# Patient Record
Sex: FEMALE | Race: OTHER | HISPANIC OR LATINO | Employment: FULL TIME | URBAN - METROPOLITAN AREA
[De-identification: names, ages, dates, MRNs, and addresses within clinical notes are randomized per-mention and may not be internally consistent; named-entity substitution may affect disease eponyms.]

---

## 2024-11-08 ENCOUNTER — OCCUPATIONAL HEALTH (OUTPATIENT)
Dept: URGENT CARE | Facility: CLINIC | Age: 49
End: 2024-11-08

## 2024-11-08 VITALS
HEART RATE: 66 BPM | WEIGHT: 172 LBS | TEMPERATURE: 98 F | OXYGEN SATURATION: 98 % | RESPIRATION RATE: 17 BRPM | SYSTOLIC BLOOD PRESSURE: 98 MMHG | BODY MASS INDEX: 28.66 KG/M2 | HEIGHT: 65 IN | DIASTOLIC BLOOD PRESSURE: 42 MMHG

## 2024-11-08 DIAGNOSIS — Z02.89 ENCOUNTER FOR PHYSICAL EXAMINATION RELATED TO EMPLOYMENT: Primary | ICD-10-CM

## 2024-11-15 ENCOUNTER — PATIENT MESSAGE (OUTPATIENT)
Dept: GASTROENTEROLOGY | Facility: CLINIC | Age: 49
End: 2024-11-15
Payer: COMMERCIAL

## 2024-11-15 ENCOUNTER — PATIENT MESSAGE (OUTPATIENT)
Dept: INTERNAL MEDICINE | Facility: CLINIC | Age: 49
End: 2024-11-15
Payer: COMMERCIAL

## 2024-12-03 ENCOUNTER — OFFICE VISIT (OUTPATIENT)
Dept: GASTROENTEROLOGY | Facility: CLINIC | Age: 49
End: 2024-12-03
Payer: COMMERCIAL

## 2024-12-03 VITALS — HEIGHT: 66 IN | BODY MASS INDEX: 27.88 KG/M2 | TEMPERATURE: 99 F | WEIGHT: 173.5 LBS

## 2024-12-03 DIAGNOSIS — R14.0 BLOATING SYMPTOM: ICD-10-CM

## 2024-12-03 DIAGNOSIS — K58.2 IRRITABLE BOWEL SYNDROME WITH BOTH CONSTIPATION AND DIARRHEA: ICD-10-CM

## 2024-12-03 DIAGNOSIS — Z87.19 HISTORY OF GALLSTONES: ICD-10-CM

## 2024-12-03 DIAGNOSIS — R10.10 UPPER ABDOMINAL PAIN: Primary | ICD-10-CM

## 2024-12-03 DIAGNOSIS — Z87.898 HISTORY OF FEVER: ICD-10-CM

## 2024-12-03 DIAGNOSIS — R14.2 ERUCTATION: ICD-10-CM

## 2024-12-03 DIAGNOSIS — Z87.19 HISTORY OF GASTROESOPHAGEAL REFLUX (GERD): ICD-10-CM

## 2024-12-03 DIAGNOSIS — K44.9 HIATAL HERNIA: ICD-10-CM

## 2024-12-03 DIAGNOSIS — Z87.898 HISTORY OF HEADACHE: ICD-10-CM

## 2024-12-03 PROCEDURE — 99999 PR PBB SHADOW E&M-EST. PATIENT-LVL III: CPT | Mod: PBBFAC,,, | Performed by: NURSE PRACTITIONER

## 2024-12-03 PROCEDURE — 3008F BODY MASS INDEX DOCD: CPT | Mod: CPTII,S$GLB,, | Performed by: NURSE PRACTITIONER

## 2024-12-03 PROCEDURE — 3044F HG A1C LEVEL LT 7.0%: CPT | Mod: CPTII,S$GLB,, | Performed by: NURSE PRACTITIONER

## 2024-12-03 PROCEDURE — 1159F MED LIST DOCD IN RCRD: CPT | Mod: CPTII,S$GLB,, | Performed by: NURSE PRACTITIONER

## 2024-12-03 PROCEDURE — 1160F RVW MEDS BY RX/DR IN RCRD: CPT | Mod: CPTII,S$GLB,, | Performed by: NURSE PRACTITIONER

## 2024-12-03 PROCEDURE — 99215 OFFICE O/P EST HI 40 MIN: CPT | Mod: S$GLB,,, | Performed by: NURSE PRACTITIONER

## 2024-12-03 RX ORDER — ESOMEPRAZOLE MAGNESIUM 40 MG/1
CAPSULE, DELAYED RELEASE ORAL
COMMUNITY
Start: 2024-11-28

## 2024-12-03 NOTE — PROGRESS NOTES
Subjective:       Patient ID: Lester Gutierrez is a 49 y.o. female Body mass index is 28 kg/m².    Chief Complaint: Abdominal Pain (bloating)    Established patient of MARTY Scruggs NP, & myself.     Patient did not complete gastric emptying study as previously recommended. Had ultrasound done in 9/2024. CT scan ordered by [place] [facility] doctor which she has not completed yet. Patient is intereseted in motility and/or pH testing.    PRIOR HISTORY: Patient has seen multiple GI providers, including work up in [place] and [place]. Patient reports she had EGD and colonoscopy done in 7/2022 in [place] but patient did not bring copy of these records. I was able to see the date of the procedures in care everywhere but not able to pull up the report.  Patient did bring her laptop which had prior copies of radiology reports of an MRCP done 12/3/2021 at a hospital in [place], which showed evidence of mesenteric adenitis described by radiologist as small scattered mesenteric lymph nodes that are nonspecific. I requested patient send us a copy of prior abdominal imaging radiology reports via e-mail through [facility] or print and send us a copy of it.    GI Problem  The primary symptoms include fever (had fever yesterday tmax 100.1 F, none today), weight loss (trying to lose weight), abdominal pain and diarrhea (occasional, denies currently). Primary symptoms do not include fatigue, nausea, vomiting, melena, hematemesis, jaundice, hematochezia or dysuria.   The abdominal pain began more than 2 days ago (started several years ago, had EGD and colonoscopy done in 7/2022 in [place]). Progression: waxing and waning. The abdominal pain is located in the LUQ, epigastric region and RUQ (sometimes after eating, takes long time to digest food; occurs a few times a week). The severity of the abdominal pain is 0/10 (currently). The abdominal pain is relieved by nothing (worse with sweets).   The illness is also significant for bloating  (frequent belching) and constipation (occasional, denies currently). The illness does not include chills, dysphagia or odynophagia. Associated symptoms comments: Bowel movements are once daily of formed stool currently  PAST TREATMENT: reglan, protonix. Significant associated medical issues include GERD (intermittent reflux; takes pepcid 40 mg daily PRN & nexium 40 mg daily; frequent belching, reflux occurs about once a week), gallstones and irritable bowel syndrome. Associated medical issues do not include inflammatory bowel disease.     Review of Systems   Constitutional:  Positive for fever (had fever yesterday tmax 100.1 F, none today) and weight loss (trying to lose weight). Negative for appetite change, chills and fatigue.   HENT:  Negative for trouble swallowing.    Respiratory:  Negative for cough, choking and shortness of breath.    Cardiovascular:  Negative for chest pain.   Gastrointestinal:  Positive for abdominal pain, bloating (frequent belching), constipation (occasional, denies currently) and diarrhea (occasional, denies currently). Negative for anal bleeding, blood in stool, dysphagia, hematemesis, hematochezia, jaundice, melena, nausea, rectal pain and vomiting.   Genitourinary:  Negative for difficulty urinating, dysuria and flank pain.   Neurological:  Positive for headaches (reports at times she gets a headache with eating). Negative for weakness.       Patient's last menstrual period was 01/01/2021.  Past Medical History:   Diagnosis Date    Colon polyp     Diverticulosis     Gallstone     GERD (gastroesophageal reflux disease)     Hiatal hernia     Hyperlipidemia     Small intestinal bacterial overgrowth (SIBO)      Past Surgical History:   Procedure Laterality Date    COLONOSCOPY  01/06/2020    Dr. South; small diverticulum in the ascending colon, otherwise normal; repeat in 10 years    COLONOSCOPY  07/26/2022    done in [place]    EGD - EXTERNAL RESULT  07/26/2022    done in [place]    UPPER  GASTROINTESTINAL ENDOSCOPY  06/24/2021    Dr. Gayle; no report available; Bx: chronic gastritis, negative for metaplasia, dysplasia and for organisms    UPPER GASTROINTESTINAL ENDOSCOPY  12/03/2020    Dr. Gayle; small hiatal hernia; erythematous mucosa in the antrum; Bx: chronic gastritis, focal intestinal metaplasia, no H Pylori     Family History   Problem Relation Name Age of Onset    Hyperlipidemia Mother      Hypertension Mother      Hyperlipidemia Father      Alzheimer's disease Father      Stroke Maternal Grandfather      Stroke Paternal Grandmother      Heart disease Paternal Grandfather      Colon cancer Maternal Uncle      Crohn's disease Other great maternal uncle     Ulcerative colitis Neg Hx       Social History     Tobacco Use    Smoking status: Never     Passive exposure: Never    Smokeless tobacco: Never   Substance Use Topics    Alcohol use: Not Currently    Drug use: Never     Wt Readings from Last 10 Encounters:   12/03/24 78.7 kg (173 lb 8 oz)   11/08/24 78 kg (172 lb)   09/27/24 77.7 kg (171 lb 4.8 oz)   09/26/24 77.6 kg (171 lb 1.2 oz)   09/25/24 78 kg (171 lb 15.3 oz)   06/25/24 75.5 kg (166 lb 7.2 oz)   01/17/24 76.1 kg (167 lb 14.1 oz)   11/22/23 73.1 kg (161 lb 2.5 oz)   09/22/23 72.3 kg (159 lb 6.3 oz)   05/05/23 72.3 kg (159 lb 8 oz)     Lab Results   Component Value Date    WBC 4.14 06/25/2024    HGB 12.9 06/25/2024    HCT 40.5 06/25/2024    MCV 92 06/25/2024     06/25/2024     CMP  Sodium   Date Value Ref Range Status   08/09/2024 137 136 - 145 mmol/L Final     Potassium   Date Value Ref Range Status   08/09/2024 3.9 3.5 - 5.1 mmol/L Final     Chloride   Date Value Ref Range Status   08/09/2024 103 95 - 110 mmol/L Final     CO2   Date Value Ref Range Status   08/09/2024 28 23 - 29 mmol/L Final     Glucose   Date Value Ref Range Status   08/09/2024 85 70 - 110 mg/dL Final     BUN   Date Value Ref Range Status   08/09/2024 17 6 - 20 mg/dL Final     Creatinine   Date Value Ref  "Range Status   08/09/2024 0.7 0.5 - 1.4 mg/dL Final     Calcium   Date Value Ref Range Status   08/09/2024 9.5 8.7 - 10.5 mg/dL Final     Total Protein   Date Value Ref Range Status   08/09/2024 7.2 6.0 - 8.4 g/dL Final     Albumin   Date Value Ref Range Status   08/09/2024 4.0 3.5 - 5.2 g/dL Final     Total Bilirubin   Date Value Ref Range Status   08/09/2024 0.5 0.1 - 1.0 mg/dL Final     Comment:     For infants and newborns, interpretation of results should be based  on gestational age, weight and in agreement with clinical  observations.    Premature Infant recommended reference ranges:  Up to 24 hours.............<8.0 mg/dL  Up to 48 hours............<12.0 mg/dL  3-5 days..................<15.0 mg/dL  6-29 days.................<15.0 mg/dL       Alkaline Phosphatase   Date Value Ref Range Status   08/09/2024 88 55 - 135 U/L Final     AST   Date Value Ref Range Status   08/09/2024 26 10 - 40 U/L Final     ALT   Date Value Ref Range Status   08/09/2024 52 (H) 10 - 44 U/L Final     Anion Gap   Date Value Ref Range Status   08/09/2024 6 (L) 8 - 16 mmol/L Final     eGFR if    Date Value Ref Range Status   11/29/2021 >60.0 >60 mL/min/1.73 m^2 Final     eGFR if non    Date Value Ref Range Status   11/29/2021 >60.0 >60 mL/min/1.73 m^2 Final     Comment:     Calculation used to obtain the estimated glomerular filtration  rate (eGFR) is the CKD-EPI equation.        Lab Results   Component Value Date    LIPASE 31 10/27/2021     Lab Results   Component Value Date    TSH 1.516 06/25/2024 2022 sucrose testing WNL    Reviewed prior medical records including radiology report of 11/9/2024 CT chest; 5/9/2023 CT abdomen pelvis with contrast; 7/18/2022 pelvic ultrasound; 9/14/2024 abdominal ultrasound in media; 3/7/2023 visit note with Dr. Way "Pt returns re: chronic GI symptoms. Chart reviewed in detail. Outside EGD/C-scope reports and path results from [place], [place], 2022 reviewed. " "Endoscopic exams unremarkable with benign biopsies. Pt continues with "burning" abd pain, upper abdomen, LUQ, left flank, back. No fever or jaundice. Appetite and weight stable/increased. No GI bleeding. No vomiting. No dysphagia. Positive pyrosis. Taking pepcid 20 mg daily (stopped nexium). Pt acknowledges significant stress at work. Repeat CT recommended by NP Johnna to f/u adenitis on CT in 9/2022. Recent LFT's normal." & "IMP: Multitude of non-specific GI symptoms, likely multi-factorial etiology, including GERD, IBS/spasm, prior mesenteric adenitis, exacerbated by life stressors.  PLAN: 1. Increase pepcid to 40 mg (pt agreeable).              2. Trial of PRN librax (I discussed rationale for medication, potential side effects, which pt understands and agreeable. She will start with PM dose when staying at home to see how tolerated).              3. F/U abd CT scan"; 10/17/2022 visit note with orthopedics; & endoscopy history (see surgical history/procedures).    Reviewed prior medical records in care everywhere including 10/7/2024 GI telemedicine visit note "Lester Gutierrez is a 48 y.o. female patient who I am seeing in consultation today to discuss her medical progress.  Patient continues to have eructation, gas, presently using esomeprazole  Abdominal ultrasound with the presence of a possible small gallstone. The patient continues to have pain in the upper abdomen.  Mild elevation of the liver enzymes.  History of SIBO. The patient has been on rifaximin intermittently.  Unclear the etiology of her symptoms. Possibilities includes biliary colic, bacterial overgrowth, irritable bowel syndrome, or gastroesophageal reflux disease." & "Plan: Abdominal and pelvic CT scan  We discussed with the patient the possibility of requesting surgical consultation for a second opinion  The patient understands that if the symptom continues or worsens, then depending on the circumstances, further evaluation or " "therapeutic efforts may be necessary at that point. Discussed with patient."; 8/12/2022 GI telemedicine visit note; & endoscopy history (see surgical history).      Objective:      Physical Exam  Vitals and nursing note reviewed.   Constitutional:       General: She is not in acute distress.     Appearance: Normal appearance. She is well-developed. She is not diaphoretic.   HENT:      Mouth/Throat:      Lips: Pink. No lesions.      Mouth: Mucous membranes are moist. No oral lesions.      Tongue: No lesions.      Pharynx: Oropharynx is clear. No pharyngeal swelling or posterior oropharyngeal erythema.   Eyes:      General: No scleral icterus.     Conjunctiva/sclera: Conjunctivae normal.   Pulmonary:      Effort: Pulmonary effort is normal. No respiratory distress.      Breath sounds: Normal breath sounds. No wheezing.   Abdominal:      General: Bowel sounds are normal. There is no distension or abdominal bruit.      Palpations: Abdomen is soft. Abdomen is not rigid. There is no mass.      Tenderness: There is no abdominal tenderness. There is no guarding or rebound.   Skin:     General: Skin is warm and dry.      Coloration: Skin is not jaundiced or pale.      Findings: No erythema or rash.   Neurological:      Mental Status: She is alert and oriented to person, place, and time.   Psychiatric:         Behavior: Behavior normal.         Thought Content: Thought content normal.         Judgment: Judgment normal.         Assessment:       1. Upper abdominal pain    2. Bloating symptom    3. Irritable bowel syndrome with both constipation and diarrhea    4. History of gallstones    5. Hiatal hernia    6. History of gastroesophageal reflux (GERD)    7. Eructation    8. History of fever    9. History of headache        Plan:       Upper abdominal pain & Bloating symptom  -     NM Gastric Emptying; Future; Expected date: 12/03/2024  -     Ambulatory referral/consult to Gastroenterology; Future; Expected date: 12/10/2024; " tertiary care referral to see one of the GI specialist at [facility]. Our [facility] facility does not perform pH studies  - patient reports she will send us a copy of the radiology report of the recent abdominal ultrasound she had done  - can complete CT scan as ordered by other GI provider. Recommend staying with one GI group for continuity of care. Patient verbalized understanding  - avoid/minimize use of NSAIDs- since they can cause GI upset, bleeding and/or ulcers. If NSAID must be taken, recommend take with food.  - recommend OTC simethicone as directed, such as Phazyme or Gas-x  - recommend low gas diet: Reduce or eliminate these foods from your diet: Broccoli, Cauliflower, [place] sprouts, Cabbage, Cooked dried beans, Carbonated beverages (sparkling water, soda, beer, champagne)  Other Causes Of Excess Gas Include:   1) EATING TOO FAST or TALKING WHILE YOU CHEW may cause you to swallow air. This increases the amount of gas in the stomach and may worsen your symptoms.  --> Chew each mouthful completely before swallowing. Take your time.  2) OVEREATING may increase the feeling of being bloated and cause more gas.  --> When you are full, stop eating.  3) CONSTIPATION can increase the amount of normal intestinal gas.  --> Avoid constipation by increasing the amount of fiber in your diet by including whole cereal grains, fresh vegetables (except those in the above list) and fresh fruits. High-fiber foods absorb water and carry it out of the body. When increasing the amount of fiber in your diet, you also need to increase the amount of water that you drink. You should drink at least eight 8-ounce glasses of water (two quarts) per day.    Irritable bowel syndrome with both constipation and diarrhea  - recommend OTC probiotic, such as Align or Culturelle, as directed  - avoid lactose  - drink adequate water intake  - smaller, more frequent meals  - avoid trigger foods  - Recommend high fiber diet (20-30  grams of fiber daily)/OTC fiber supplements daily as directed, such as Benefiber or Metamucil.    History of gallstones  - Recommend follow-up with general surgery for continued evaluation and management. Referral placed.    Hiatal hernia  - if symptoms persist despite medication management and lifestyle/dietary modifications, we can refer to general surgery to consult about surgical options    History of gastroesophageal reflux (GERD) & Eructation  -     Ambulatory referral/consult to Gastroenterology; Future; Expected date: 12/10/2024; tertiary care referral to see one of the GI specialist at [facility]. Our [facility] facility does not perform pH studies  - CONTINUE NEXIUM 40 MG DAILY AS DIRECTED  - TAKE PEPCID 40 MG PO ONCE DAILY, RATHER THAN PRN  - avoid/minimize use of NSAIDs- since they can cause GI upset, bleeding and/or ulcers. If NSAID must be taken, recommend take with food.    History of fever  Recommend follow-up with Primary Care Provider for continued evaluation and management.    History of headache  Recommend follow-up with Primary Care Provider/neurology for continued evaluation and management.    Follow up in about 1 month (around 1/3/2025), or if symptoms worsen or fail to improve.      If no improvement in symptoms or symptoms worsen, call/follow-up at clinic or go to ER.        40 minutes of total time spent on the encounter, which includes face to face time and non-face to face time preparing to see the patient (e.g., review of tests), Obtaining and/or reviewing separately obtained history, Documenting clinical information in the electronic or other health record, Independently interpreting results (not separately reported) and communicating results to the patient/family/caregiver, or Care coordination (not separately reported).

## 2024-12-05 ENCOUNTER — NURSE TRIAGE (OUTPATIENT)
Dept: ADMINISTRATIVE | Facility: CLINIC | Age: 49
End: 2024-12-05
Payer: COMMERCIAL

## 2024-12-05 NOTE — TELEPHONE ENCOUNTER
Fell this morning; denies hitting head, denies LOC    Below ankle on foot, swelling  R foot, R side of R foot  Reports that swollen part of foot appears gray and green in color    Reports that when she twisted the ankle, a green lump appeared under skin.      Able to move toes  Denies bleeding    Has been icing the foot, not helping with the swelling.    Advised pt to be seen in ER now. Provided pt with ER wait times. Pt VU.       Reason for Disposition   Sounds like a serious injury to the triager    Additional Information   Negative: Serious injury with multiple fractures (broken bones)   Negative: [1] Major bleeding (e.g., actively dripping or spurting) AND [2] can't be stopped   Negative: Amputation   Negative: Looks like a dislocated joint (very crooked or deformed)   Negative: Sounds like a life-threatening emergency to the triager   Negative: Bullet wound, stabbed by knife, or other serious penetrating wound   Negative: Skin is split open or gaping (or length > 1/2 inch or 12 mm)   Negative: [1] Bleeding AND [2] won't stop after 10 minutes of direct pressure (using correct technique)   Negative: [1] Dirt in the wound AND [2] not removed with 15 minutes of scrubbing   Negative: Can't stand (bear weight) or walk   Negative: [1] New numbness (loss of sensation) of foot or toe(s) AND [2] present now    Protocols used: Foot Injury-A-

## 2024-12-11 ENCOUNTER — TELEPHONE (OUTPATIENT)
Dept: GASTROENTEROLOGY | Facility: CLINIC | Age: 49
End: 2024-12-11
Payer: COMMERCIAL

## 2024-12-11 NOTE — TELEPHONE ENCOUNTER
Left a voicemail for patient regarding scheduling an appointment in Dr. Hernández's clinic. Provided [facility] GI clinic call back number.

## 2024-12-13 ENCOUNTER — HOSPITAL ENCOUNTER (OUTPATIENT)
Dept: RADIOLOGY | Facility: HOSPITAL | Age: 49
Discharge: HOME | End: 2024-12-13
Attending: NURSE PRACTITIONER
Payer: COMMERCIAL

## 2024-12-13 ENCOUNTER — TELEPHONE (OUTPATIENT)
Dept: NEUROLOGY | Facility: CLINIC | Age: 49
End: 2024-12-13
Payer: COMMERCIAL

## 2024-12-13 ENCOUNTER — PATIENT MESSAGE (OUTPATIENT)
Dept: GASTROENTEROLOGY | Facility: CLINIC | Age: 49
End: 2024-12-13
Payer: COMMERCIAL

## 2024-12-13 ENCOUNTER — TELEPHONE (OUTPATIENT)
Dept: INTERNAL MEDICINE | Facility: CLINIC | Age: 49
End: 2024-12-13
Payer: COMMERCIAL

## 2024-12-13 DIAGNOSIS — R14.0 BLOATING SYMPTOM: ICD-10-CM

## 2024-12-13 DIAGNOSIS — R10.10 UPPER ABDOMINAL PAIN: ICD-10-CM

## 2024-12-13 PROCEDURE — A9541 TC99M SULFUR COLLOID: HCPCS | Performed by: NURSE PRACTITIONER

## 2024-12-13 PROCEDURE — 78264 GASTRIC EMPTYING IMG STUDY: CPT | Mod: 26,,, | Performed by: NUCLEAR MEDICINE

## 2024-12-13 PROCEDURE — 78264 GASTRIC EMPTYING IMG STUDY: CPT | Mod: TC

## 2024-12-13 RX ORDER — TECHNETIUM TC 99M SULFUR COLLOID 2 MG
1 KIT MISCELLANEOUS
Status: COMPLETED | OUTPATIENT
Start: 2024-12-13 | End: 2024-12-13

## 2024-12-13 RX ADMIN — TECHNETIUM TC 99M SULFUR COLLOID KIT 1.1 MILLICURIE: KIT at 08:12

## 2024-12-13 NOTE — TELEPHONE ENCOUNTER
Patient arrived to [facility] to drop off MRI CD. Patient stated she cannot lose the disc and asked if she can stay and wait for CD to be uploaded. Informed patient that she will have to come back another day to pick CD back up as we would have to fill out a form and drop it off to radiology so they can upload images to patient's chart. Patient was upset and asked why she can't stay and wait for it and that if the disc gets lost [place]. Informed patient that we haven't gotten any complaints of lost CD's so far and that we have to go through a process to fill out a CD form, drop it off to radiology in a mailbox, and that they will call her when it is ready to be picked up.  Patient then stated that the provider is so complicated and that she was going to go downstairs to drop it off herself. Informed patient she will not have access to radiology to be able to go back there and drop it off. Patient snatched back CD and left.

## 2025-02-24 ENCOUNTER — PATIENT MESSAGE (OUTPATIENT)
Dept: GASTROENTEROLOGY | Facility: CLINIC | Age: 50
End: 2025-02-24
Payer: COMMERCIAL

## 2025-02-26 NOTE — TELEPHONE ENCOUNTER
The gastric emptying study showed negative for gastroparesis. We recommend seeing one of the GI providers at [facility] for further evaluation and management of chronic GI symptoms as discussed during our last visit. Referral placed previously.  Sincerely,  April LOPES

## 2025-03-11 ENCOUNTER — TELEPHONE (OUTPATIENT)
Dept: INTERNAL MEDICINE | Facility: CLINIC | Age: 50
End: 2025-03-11
Payer: COMMERCIAL

## 2025-03-12 DIAGNOSIS — R74.8 ELEVATED LIVER ENZYMES: Primary | ICD-10-CM

## 2025-03-12 DIAGNOSIS — Z00.00 ANNUAL PHYSICAL EXAM: ICD-10-CM

## 2025-03-14 ENCOUNTER — OFFICE VISIT (OUTPATIENT)
Dept: INTERNAL MEDICINE | Facility: CLINIC | Age: 50
End: 2025-03-14
Payer: COMMERCIAL

## 2025-03-14 VITALS — SYSTOLIC BLOOD PRESSURE: 116 MMHG | HEART RATE: 63 BPM | DIASTOLIC BLOOD PRESSURE: 56 MMHG

## 2025-03-14 DIAGNOSIS — R41.3 MEMORY CHANGES: ICD-10-CM

## 2025-03-14 DIAGNOSIS — E83.52 SERUM CALCIUM ELEVATED: ICD-10-CM

## 2025-03-14 DIAGNOSIS — K29.70 GASTRITIS WITH INTESTINAL METAPLASIA OF STOMACH: ICD-10-CM

## 2025-03-14 DIAGNOSIS — F41.1 GENERALIZED ANXIETY DISORDER: ICD-10-CM

## 2025-03-14 DIAGNOSIS — M85.80 OSTEOPENIA, UNSPECIFIED LOCATION: ICD-10-CM

## 2025-03-14 DIAGNOSIS — Z12.9 CANCER SCREENING: ICD-10-CM

## 2025-03-14 DIAGNOSIS — Z00.00 ANNUAL PHYSICAL EXAM: Primary | ICD-10-CM

## 2025-03-14 DIAGNOSIS — F51.12 INSUFFICIENT SLEEP SYNDROME: ICD-10-CM

## 2025-03-14 DIAGNOSIS — E78.2 MIXED HYPERLIPIDEMIA: ICD-10-CM

## 2025-03-14 DIAGNOSIS — Z82.0 FAMILY HISTORY OF ALZHEIMER'S DISEASE: ICD-10-CM

## 2025-03-14 DIAGNOSIS — K31.A0 GASTRITIS WITH INTESTINAL METAPLASIA OF STOMACH: ICD-10-CM

## 2025-03-14 DIAGNOSIS — Z00.00 HEALTHCARE MAINTENANCE: ICD-10-CM

## 2025-03-14 DIAGNOSIS — Z78.0 MENOPAUSE: ICD-10-CM

## 2025-03-14 DIAGNOSIS — K58.2 IRRITABLE BOWEL SYNDROME WITH BOTH CONSTIPATION AND DIARRHEA: ICD-10-CM

## 2025-03-14 PROBLEM — M62.81 MUSCLE WEAKNESS: Status: RESOLVED | Noted: 2022-11-21 | Resolved: 2025-03-14

## 2025-03-14 PROBLEM — Z71.89 ACP (ADVANCE CARE PLANNING): Status: RESOLVED | Noted: 2024-06-25 | Resolved: 2025-03-14

## 2025-03-14 PROBLEM — T78.1XXA GASTROINTESTINAL FOOD SENSITIVITY: Status: RESOLVED | Noted: 2021-04-21 | Resolved: 2025-03-14

## 2025-03-14 PROBLEM — R07.89 INTERMITTENT LEFT-SIDED CHEST PAIN: Status: RESOLVED | Noted: 2022-05-06 | Resolved: 2025-03-14

## 2025-03-14 PROCEDURE — 99999 PR PBB SHADOW E&M-EST. PATIENT-LVL IV: CPT | Mod: PBBFAC,,, | Performed by: STUDENT IN AN ORGANIZED HEALTH CARE EDUCATION/TRAINING PROGRAM

## 2025-03-14 NOTE — ASSESSMENT & PLAN NOTE
Not on medication.    Continue management as per gynecology.    FSH, LH, estradiol order as per patient's request

## 2025-03-14 NOTE — ASSESSMENT & PLAN NOTE
Patient continues experiencing epigastric pain and bloating.    I am ordering EGD and colonoscopy.    Continue PPI  Follow-up with gastroenterology

## 2025-03-14 NOTE — PROGRESS NOTES
"Subjective:       Patient ID: Lester Gutierrez is a 49 y.o. female.    Chief Complaint: Annual Exam    HPI    Lester Gutierrez is a 49 y.o. 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  She wants to get tested for all the preventative care screening exams and images as necessary.      She has several concerns today:    She wants a full checkup including labs and images.    She wants to have a repeat PTH in view of a history of elevated PTH in the past.  Currently asymptomatic.    She continues to experience mild back and hip pain.    She wants to have a bone density scan done given she was diagnosed with osteopenia in the past.  She wants to have a urinalysis done because she has noticed "White powder in the urine". No abdominal pain.  She checks her glucose levels at home, and she has noticed that her fasting glucose has been mildly elevated.  Insufficient sleep: She continues to take melatonin to help her sleep.  She is doing physical activity.  Anxiety:  Improved.  She discontinued the use of Lexapro but she still has some tablets left.  She has been doing physical activity and tried to manage her anxiety with lifestyle modification, mindfulness.    Last year events:  She was diagnosed with influenza and she had a foot fracture.  Rash over the right leg: Occasionally, maculopapular, resolves with moisturizer.      Patient was seen by a gastroenterologist in [place], they completed a motility study which was normal.    She continues to experience burping, bloating, diarrhea and " constipation.  They recommended to see a gastroenterologist at the [facility].      She also continues to experience memory changes.  She is very concerned about developing Alzheimer's disease given her father's history of Alzheimer's disease.  He has very impaired at age 84.  She wants a 2nd opinion.    She completed her mammogram in [place].      Patient denies CV symptoms, CP, SOB, palpitations.  Patient denies constitutional symptoms, fever, changes in the urine or stool.    Patient wants to be screened for cancer, she is interested in the calorie test.    No other complaints      Latest PCP visits:      08/01/2024:  Encounter to discuss test results  06/25/24 AWV  11/22/23 CHRISTINA f/u  9/22/23 Venous insufficiency  5/5/23 AWV    Changes in health or medications: No    Specialists visits and recommendations:        H/o ER or Urgent care visits:   NO    H/o Hospitalizations:  NO    H/o falls: None     Life events / lifestyle:   Rosy s in [place] and she wants to go for Medical school      Most recent / available laboratories and studies reviewed with the patient:    Recent Labs   Lab 09/28/22  0553 06/25/24  0838 12/06/24  0859   WBC 4.05 4.14 7.23   Hemoglobin 13.1 12.9 12.5   Hematocrit 41.0 40.5 37.6   MCV 89 92 87   Platelets 253 223 257       Recent Labs   Lab 06/25/24  0838 08/09/24  1016 12/06/24  0859   Glucose 88 85 118 H   Sodium 141 137 137   Potassium 4.0 3.9 4.0   BUN 14 17 8   Creatinine 0.7 0.7 0.58   Total Bilirubin 0.5 0.5 0.8   AST 22 26 52 H   ALT 26 52 H 52 H       Recent Labs   Lab 06/25/24  0838   Hemoglobin A1C 5.4       Recent Labs   Lab 08/26/22  1004 06/25/24  0838   Cholesterol 243 H 214 H   Triglycerides 91 51   HDL 64 59   LDL Cholesterol 160.8 H 144.8   Non-HDL Cholesterol 179 155       Recent Labs   Lab 08/26/22  1004 06/25/24  0838 12/06/24  0859   TSH 1.582 1.516 0.920             Results for orders placed or performed during the hospital encounter of 12/06/24    EKG 12-lead    Collection Time: 24  8:53 AM   Result Value Ref Range    QRS Duration 94 ms    [facility] QTC Calculation 422 ms    Narrative    Test Reason : R07.9,    Vent. Rate :  75 BPM     Atrial Rate :  75 BPM     P-R Int : 152 ms          QRS Dur :  94 ms      QT Int : 378 ms       P-R-T Axes :  58  83  15 degrees    QTcB Int : 422 ms    Normal sinus rhythm  Normal ECG  Confirmed by Cinda Cho ([id number]) on 12/10/2024 12:40:32 PM    Referred By: AAAREFERRAL SELF           Confirmed By: Cinda Cho       NM Gastric Emptying  Narrative: EXAMINATION:  NM GASTRIC EMPTYING    CLINICAL HISTORY:  Upper abdominal pain, unspecified 49-year-old female with persisting abdominal pain    TECHNIQUE:  The patient received 1.1 mCi orally of sulfur colloid labeled meal in less than 10 minutes.    Anterior and posterior projection images were obtained immediately and at 60 minute intervals for 3 hours.    Geometric mean of the anterior and the posterior images was generated. The decay-corrected time activity curve and the percentage of the retention and emptying were obtained.    COMPARISON:  None.    FINDINGS:  At 3 hour(s) the percentage of retention is 10 % (normal retention at 4 hours is 10% and lower).  Impression: Normal radionuclide solid gastric emptying study with 3 hour gastric retention of  10%.    Electronically signed by: Radha Fuentes  Date:    2024  Time:    11:57       Current medications:    Current Outpatient Medications:     esomeprazole (NEXIUM) 40 MG capsule, SMARTSI.0 Capsule(s) By Mouth Daily, Disp: , Rfl:     famotidine (PEPCID) 40 MG tablet, TAKE 1 TABLET(40 MG) BY MOUTH EVERY DAY, Disp: 30 tablet, Rfl: 2    tretinoin microspheres (RETIN-A MICRO PUMP) 0.06 % GlwP, Apply to face at bedtime. Wash off in morning and apply sunscreen., Disp: 50 g, Rfl: 0      Healthcare Maintenance:  Colon cancer screenin  Last Colonoscopy completed on 2022     Vaccinations:        Tetanus: 2015        Pneumonia: N/A       Zoster: n/a       Influenza: N/A       COVID vaccin ation: completed x 3     Depression screening: PHQ2 score = 0     Annual Wellness visit approx. Month: September    Past Medical History reviewed and updated:    Past Medical History:   Diagnosis Date    Colon polyp     Diverticulosis     Gallstone     GERD (gastroesophageal reflux disease)     Hiatal hernia     Hyperlipidemia     Small intestinal bacterial overgrowth (SIBO)        Past Surgical History:   Procedure Laterality Date    COLONOSCOPY  01/06/2020    Dr. South; small diverticulum in the ascending colon, otherwise normal; repeat in 10 years    COLONOSCOPY  07/26/2022    done in [place]    EGD - EXTERNAL RESULT  07/26/2022    done in [place]    UPPER GASTROINTESTINAL ENDOSCOPY  06/24/2021    Dr. Gayle; no report available; Bx: chronic gastritis, negative for metaplasia, dysplasia and for organisms    UPPER GASTROINTESTINAL ENDOSCOPY  12/03/2020    Dr. Gayle; small hiatal hernia; erythematous mucosa in the antrum; Bx: chronic gastritis, focal intestinal metaplasia, no H Pylori       Family History   Problem Relation Name Age of Onset    Hyperlipidemia Mother      Hypertension Mother      Hyperlipidemia Father      Alzheimer's disease Father      Stroke Maternal Grandfather      Stroke Paternal Grandmother      Heart disease Paternal Grandfather      Colon cancer Maternal Uncle      Crohn's disease Other great maternal uncle     Ulcerative colitis Neg Hx         ROS  11-point review of systems done. Negative except for detailed in the HPI.        Objective:      BP (!) 116/56 (Patient Position: Sitting)   Pulse 63   LMP 01/01/2021      Physical Exam   General appearance: Good general aspect, NAD, conversant   Eyes and HEENT: Normal sclerae, moist mucous membranes, no thyromegaly or lymphadenopathy  Respiratory: No work of breathing, clear to auscultation bilaterally. No rales, rhonchi, wheezing, or rubs.  Cardiovascular: PMI not  displaced. RRR. Normal S1, S2. No murmurs, rubs or gallops.  Abdomen and : Soft, non-tender, nondistended, BS, no hepatosplenomegaly, no masses.  Extremities: no edemas, no extremity lymphadenopathy, no clubbing, no cyanosis.  Nervous System: Alert and oriented x 3, good concentration, no deficits.  Skin: Normal temperature, turgor and texture; no rash, ulcers or subcutaneous nodules  Psych: Appropriate affect, alert and oriented to person, place and time          Assessment:       1. Annual physical exam  Assessment & Plan:  Patient is in overall good general health.  Stable medical conditions listed on the PMH.  Labs reviewed and patient notified.        2. Memory changes  Assessment & Plan:  Patient continues to experience recent memory changes.    She wants to be evaluated by the memory disorders group at [facility] for a 2nd opinion.    Family history of Alzheimer's disease (father).    Orders:  -     Ambulatory referral/consult to Neurology; Future; Expected date: 03/21/2025    3. Generalized anxiety disorder  Assessment & Plan:  Improved.    Patient is no longer on SSRI.    Have recommended to continue daily physical activity and resume SSRI if anxiety gets worse.      4. Mixed hyperlipidemia  Assessment & Plan:  Lipid panel ordered      5. Menopause  Assessment & Plan:  Not on medication.    Continue management as per gynecology.    FSH, LH, estradiol order as per patient's request    Orders:  -     Follicle Stimulating Hormone; Future; Expected date: 03/14/2025  -     ESTRADIOL; Future; Expected date: 03/14/2025  -     LUTEINIZING HORMONE; Future; Expected date: 03/14/2025    6. Gastritis with intestinal metaplasia of stomach  Assessment & Plan:  Patient continues experiencing epigastric pain and bloating.    I am ordering EGD and colonoscopy.    Continue PPI  Follow-up with gastroenterology    Orders:  -     Ambulatory referral/consult to Endo Procedure ; Future; Expected date: 03/15/2025    7.  Irritable bowel syndrome with both constipation and diarrhea  Assessment & Plan:  Persistence of gastrointestinal symptoms, diarrhea and constipation, bloating, burping.    EGD and colonoscopy ordered.    Follow-up with Gastroenterology    Orders:  -     Ambulatory referral/consult to Endo Procedure ; Future; Expected date: 03/15/2025    8. Insufficient sleep syndrome  Assessment & Plan:  Continue daily physical activity      9. Family history of Alzheimer's disease  -     Ambulatory referral/consult to Neurology; Future; Expected date: 03/21/2025    10. Osteopenia, unspecified location  -     DXA Bone Density Appendicular Skeleton; Future; Expected date: 03/14/2025    11. Cancer screening  -     Cancel: Galleri Multi-Cancer Screen; Future; Expected date: 03/14/2025  -     Galleri Multi-Cancer Screen; Future; Expected date: 03/14/2025    12. Serum calcium elevated  -     PTH, Intact; Future; Expected date: 03/14/2025    13. Healthcare maintenance  Assessment & Plan:  Health care maintenance and core gaps reviewed and assessed with patient.  Vaccinations recommended:        Tetanus - 2015       Shingles - N/A       Influenza - N/A        Pneumonia - N/A  Colon cancer screening:   Colonoscopy: 2022  Lifestyle recommendations given.  Physical activity recommended.    Legend: Ordered (O), Declined (D), Current (C)             Summary of orders / plan:       PTH  Mammogram.    Referral to Neurology to evaluate memory changes.          There are no Patient Instructions on file for this visit.       Problem list updated  Medications reconciled    Education provided  Lifestyle recommendations given  AVS generated, explained, and sent to the patient.  RTC in : 6 months for general f/u  Labs: Not ordered yet            LUCERO PASCAL MD, MPH  Internal Medicine  [facility]  [facility]

## 2025-03-14 NOTE — ASSESSMENT & PLAN NOTE
Improved.    Patient is no longer on SSRI.    Have recommended to continue daily physical activity and resume SSRI if anxiety gets worse.

## 2025-03-14 NOTE — ASSESSMENT & PLAN NOTE
Health care maintenance and core gaps reviewed and assessed with patient.  Vaccinations recommended:        Tetanus - 2015       Shingles - N/A       Influenza - N/A        Pneumonia - N/A  Colon cancer screening:   Colonoscopy: 2022  Lifestyle recommendations given.  Physical activity recommended.    Legend: Ordered (O), Declined (D), Current (C)

## 2025-03-14 NOTE — ASSESSMENT & PLAN NOTE
Patient continues to experience recent memory changes.    She wants to be evaluated by the memory disorders group at [facility] for a 2nd opinion.    Family history of Alzheimer's disease (father).

## 2025-03-14 NOTE — ASSESSMENT & PLAN NOTE
Persistence of gastrointestinal symptoms, diarrhea and constipation, bloating, burping.    EGD and colonoscopy ordered.    Follow-up with Gastroenterology

## 2025-03-17 ENCOUNTER — LAB VISIT (OUTPATIENT)
Dept: LAB | Facility: HOSPITAL | Age: 50
End: 2025-03-17
Attending: STUDENT IN AN ORGANIZED HEALTH CARE EDUCATION/TRAINING PROGRAM
Payer: COMMERCIAL

## 2025-03-17 ENCOUNTER — RESULTS FOLLOW-UP (OUTPATIENT)
Dept: INTERNAL MEDICINE | Facility: CLINIC | Age: 50
End: 2025-03-17

## 2025-03-17 DIAGNOSIS — R74.8 ELEVATED LIVER ENZYMES: ICD-10-CM

## 2025-03-17 DIAGNOSIS — Z78.0 MENOPAUSE: ICD-10-CM

## 2025-03-17 DIAGNOSIS — E83.52 SERUM CALCIUM ELEVATED: ICD-10-CM

## 2025-03-17 DIAGNOSIS — Z00.00 ANNUAL PHYSICAL EXAM: ICD-10-CM

## 2025-03-17 LAB
ALBUMIN SERPL BCP-MCNC: 4 G/DL (ref 3.5–5.2)
ALP SERPL-CCNC: 89 U/L (ref 40–150)
ALT SERPL W/O P-5'-P-CCNC: 46 U/L (ref 10–44)
ANION GAP SERPL CALC-SCNC: 9 MMOL/L (ref 8–16)
AST SERPL-CCNC: 33 U/L (ref 10–40)
BASOPHILS # BLD AUTO: 0.03 K/UL (ref 0–0.2)
BASOPHILS NFR BLD: 0.7 % (ref 0–1.9)
BILIRUB SERPL-MCNC: 0.4 MG/DL (ref 0.1–1)
BUN SERPL-MCNC: 19 MG/DL (ref 6–20)
CALCIUM SERPL-MCNC: 9.4 MG/DL (ref 8.7–10.5)
CHLORIDE SERPL-SCNC: 103 MMOL/L (ref 95–110)
CHOLEST SERPL-MCNC: 228 MG/DL (ref 120–199)
CHOLEST/HDLC SERPL: 3.7 {RATIO} (ref 2–5)
CO2 SERPL-SCNC: 26 MMOL/L (ref 23–29)
CREAT SERPL-MCNC: 0.7 MG/DL (ref 0.5–1.4)
DIFFERENTIAL METHOD BLD: NORMAL
EOSINOPHIL # BLD AUTO: 0.1 K/UL (ref 0–0.5)
EOSINOPHIL NFR BLD: 2.3 % (ref 0–8)
ERYTHROCYTE [DISTWIDTH] IN BLOOD BY AUTOMATED COUNT: 14.3 % (ref 11.5–14.5)
EST. GFR  (NO RACE VARIABLE): >60 ML/MIN/1.73 M^2
ESTIMATED AVG GLUCOSE: 111 MG/DL (ref 68–131)
ESTRADIOL SERPL-MCNC: 13 PG/ML
FSH SERPL-ACNC: 88.49 MIU/ML
GLUCOSE SERPL-MCNC: 102 MG/DL (ref 70–110)
HBA1C MFR BLD: 5.5 % (ref 4–5.6)
HCT VFR BLD AUTO: 39.3 % (ref 37–48.5)
HDLC SERPL-MCNC: 62 MG/DL (ref 40–75)
HDLC SERPL: 27.2 % (ref 20–50)
HGB BLD-MCNC: 12.6 G/DL (ref 12–16)
IMM GRANULOCYTES # BLD AUTO: 0 K/UL (ref 0–0.04)
IMM GRANULOCYTES NFR BLD AUTO: 0 % (ref 0–0.5)
LDLC SERPL CALC-MCNC: 156 MG/DL (ref 63–159)
LH SERPL-ACNC: 24.2 MIU/ML
LYMPHOCYTES # BLD AUTO: 1.7 K/UL (ref 1–4.8)
LYMPHOCYTES NFR BLD: 40.2 % (ref 18–48)
MCH RBC QN AUTO: 28.9 PG (ref 27–31)
MCHC RBC AUTO-ENTMCNC: 32.1 G/DL (ref 32–36)
MCV RBC AUTO: 90 FL (ref 82–98)
MONOCYTES # BLD AUTO: 0.4 K/UL (ref 0.3–1)
MONOCYTES NFR BLD: 9.3 % (ref 4–15)
NEUTROPHILS # BLD AUTO: 2 K/UL (ref 1.8–7.7)
NEUTROPHILS NFR BLD: 47.5 % (ref 38–73)
NONHDLC SERPL-MCNC: 166 MG/DL
NRBC BLD-RTO: 0 /100 WBC
PLATELET # BLD AUTO: 260 K/UL (ref 150–450)
PMV BLD AUTO: 10.1 FL (ref 9.2–12.9)
POTASSIUM SERPL-SCNC: 4 MMOL/L (ref 3.5–5.1)
PROT SERPL-MCNC: 7.5 G/DL (ref 6–8.4)
PTH-INTACT SERPL-MCNC: 62.3 PG/ML (ref 9–77)
RBC # BLD AUTO: 4.36 M/UL (ref 4–5.4)
SODIUM SERPL-SCNC: 138 MMOL/L (ref 136–145)
TRIGL SERPL-MCNC: 50 MG/DL (ref 30–150)
WBC # BLD AUTO: 4.3 K/UL (ref 3.9–12.7)

## 2025-03-17 PROCEDURE — 83970 ASSAY OF PARATHORMONE: CPT | Mod: SP | Performed by: STUDENT IN AN ORGANIZED HEALTH CARE EDUCATION/TRAINING PROGRAM

## 2025-03-17 PROCEDURE — 85025 COMPLETE CBC W/AUTO DIFF WBC: CPT | Mod: SP | Performed by: STUDENT IN AN ORGANIZED HEALTH CARE EDUCATION/TRAINING PROGRAM

## 2025-03-17 PROCEDURE — 82652 VIT D 1 25-DIHYDROXY: CPT | Mod: SP | Performed by: STUDENT IN AN ORGANIZED HEALTH CARE EDUCATION/TRAINING PROGRAM

## 2025-03-17 PROCEDURE — 83001 ASSAY OF GONADOTROPIN (FSH): CPT | Mod: SP | Performed by: STUDENT IN AN ORGANIZED HEALTH CARE EDUCATION/TRAINING PROGRAM

## 2025-03-17 PROCEDURE — 83036 HEMOGLOBIN GLYCOSYLATED A1C: CPT | Mod: SP | Performed by: STUDENT IN AN ORGANIZED HEALTH CARE EDUCATION/TRAINING PROGRAM

## 2025-03-17 PROCEDURE — 83002 ASSAY OF GONADOTROPIN (LH): CPT | Mod: SP | Performed by: STUDENT IN AN ORGANIZED HEALTH CARE EDUCATION/TRAINING PROGRAM

## 2025-03-17 PROCEDURE — 80053 COMPREHEN METABOLIC PANEL: CPT | Mod: SP | Performed by: STUDENT IN AN ORGANIZED HEALTH CARE EDUCATION/TRAINING PROGRAM

## 2025-03-17 PROCEDURE — 82670 ASSAY OF TOTAL ESTRADIOL: CPT | Mod: SP | Performed by: STUDENT IN AN ORGANIZED HEALTH CARE EDUCATION/TRAINING PROGRAM

## 2025-03-17 PROCEDURE — 80061 LIPID PANEL: CPT | Mod: SP | Performed by: STUDENT IN AN ORGANIZED HEALTH CARE EDUCATION/TRAINING PROGRAM

## 2025-03-18 ENCOUNTER — TELEPHONE (OUTPATIENT)
Dept: ENDOSCOPY | Facility: HOSPITAL | Age: 50
End: 2025-03-18
Payer: COMMERCIAL

## 2025-03-18 NOTE — TELEPHONE ENCOUNTER
Contacted the patient to schedule an endoscopy procedure(s) EGD and colonoscopy. The patient did not answer the call and left a voice message requesting a call back.

## 2025-03-19 LAB — 1,25(OH)2D3 SERPL-MCNC: 47 PG/ML (ref 20–79)

## 2025-03-25 ENCOUNTER — TELEPHONE (OUTPATIENT)
Dept: ENDOSCOPY | Facility: HOSPITAL | Age: 50
End: 2025-03-25
Payer: COMMERCIAL

## 2025-03-25 NOTE — TELEPHONE ENCOUNTER
Endoscopy Scheduling Department  [facility] [address] [place] [address]  Take the [facility] Elevators to 4th Floor Endoscopy Lab      Date: 03/25/2025      Medical Record # [id number]        Dear  Lester Gutierrez      An order for the following procedure(s) Colonoscopy and Upper Endoscopy (EGD) was placed for you by   Jeri Tenorio MD.    Since multiple attempts have been made to get in touch with you, this is the last notification. Please call the scheduling nurse to schedule this procedure as soon as possible.    If you have already scheduled this appointment, please disregard this letter. If you would like to cancel this request, please call the number listed below.     Sincerely,        Endoscopy Scheduling Department [phone]        Comments: Office hours are Monday through Friday 8-430p.

## 2025-03-25 NOTE — TELEPHONE ENCOUNTER
Contacted the patient to schedule an EGD and Colonoscopy procedure(s) . The patient did not answer the call and left a voice message requesting a call back.

## 2025-03-25 NOTE — TELEPHONE ENCOUNTER
Dear Referring Provider and Staff,    The Endoscopy Scheduling Department has made 2+ attempts to reach the patient for scheduling their EGD and Colonoscopy. All final attempts have been made for this referral, if the referring provider would like the patient to receive endoscopic care, please confirm with the patient whether they would like to proceed. If so, then submit a new referral and inform the Pt that the Endoscopy Scheduling department will be contacting them to schedule their procedure.      Thank you,    Endoscopy Scheduling Department

## 2025-03-28 ENCOUNTER — PATIENT MESSAGE (OUTPATIENT)
Dept: INTERNAL MEDICINE | Facility: CLINIC | Age: 50
End: 2025-03-28
Payer: COMMERCIAL

## 2025-04-23 ENCOUNTER — PATIENT MESSAGE (OUTPATIENT)
Dept: INTERNAL MEDICINE | Facility: CLINIC | Age: 50
End: 2025-04-23
Payer: COMMERCIAL

## 2025-04-23 DIAGNOSIS — K31.A0 GASTRITIS WITH INTESTINAL METAPLASIA OF STOMACH: Primary | ICD-10-CM

## 2025-04-23 DIAGNOSIS — K29.70 GASTRITIS WITH INTESTINAL METAPLASIA OF STOMACH: Primary | ICD-10-CM

## 2025-04-23 RX ORDER — ESOMEPRAZOLE MAGNESIUM 40 MG/1
40 CAPSULE, DELAYED RELEASE ORAL DAILY PRN
Qty: 90 CAPSULE | Refills: 3 | Status: SHIPPED | OUTPATIENT
Start: 2025-04-23 | End: 2025-04-24

## 2025-04-24 RX ORDER — ESOMEPRAZOLE MAGNESIUM 40 MG/1
40 CAPSULE, DELAYED RELEASE ORAL DAILY PRN
Qty: 90 CAPSULE | Refills: 3 | Status: SHIPPED | OUTPATIENT
Start: 2025-04-24 | End: 2026-04-24

## 2025-04-28 ENCOUNTER — PATIENT MESSAGE (OUTPATIENT)
Dept: INTERNAL MEDICINE | Facility: CLINIC | Age: 50
End: 2025-04-28
Payer: COMMERCIAL

## 2025-04-30 ENCOUNTER — TELEPHONE (OUTPATIENT)
Dept: INTERNAL MEDICINE | Facility: CLINIC | Age: 50
End: 2025-04-30

## 2025-04-30 ENCOUNTER — TELEPHONE (OUTPATIENT)
Facility: CLINIC | Age: 50
End: 2025-04-30
Payer: COMMERCIAL

## 2025-04-30 NOTE — TELEPHONE ENCOUNTER
Called and spoke with patient and the following was discussed:    Patient has been trying to get an appointment with Neuro[facility]ognitive clinic. Informed that MD is currently at capacity. We are in the process of reorganizing the neurocognitive program's clinics to accommodate more patients. Unable to provide when clinic will be expanded to be able to schedule.    Patient states she wants to be tested for her memory. Responded that her PCP or a doctor will need to place a neuropsychology referral.    Patient requested to be added to the wait list for future scheduling.    (Will need to review chart for workup)

## 2025-04-30 NOTE — TELEPHONE ENCOUNTER
----- Message from Fiona sent at 4/7/2025  9:26 AM CDT -----  Regarding: RE: Patient needs to schedule  Ok thank you so much I will notify our doctor and the patient as well  ----- Message -----  From: Amadeo Jose  Sent: 4/4/2025   6:09 PM CDT  To: Fiona Sheth  Subject: RE: Patient needs to schedule                    Hello, Dr. Carlin has asked to pause on scheduling any new patients at this time. I do not know when he wants me to resume scheduling.  ----- Message -----  From: Fiona Sheth  Sent: 4/4/2025   3:19 PM CDT  To: Raegan JOHNSON Staff  Subject: Patient needs to schedule                        Hi Good afternoon, I'm reaching out to ask for assistance in scheduling a patient Patient was referred by  to see  on 03/14/2025If some one can contact the patient to schedule that would be greatly appreciated Thank you for your time

## 2025-05-02 NOTE — TELEPHONE ENCOUNTER
Referral from PCP - Memory Changes / Fhx of AD    No MRI brain    Last seen Neurology Dr. Vega - 9/2024  [facility] - 28/30 - 9/2024  [id number] - 0.64 - 9/2024    Copied & paste Dr. Vega's note below:  Patient reports a 2 year history of brain fog and short tremor memory loss that has been persistent without being progressive.  She also reports a history of bilateral numbness and tingling in her toes that has been persistent.       Patient is concerned about dementias due to a family history of Alzheimer's in her father that was previously diagnosed as a mood disorder.  She reports she had an MRI in [place] which was normal.  MoCA was 28/30 with English as a secondary language.  5/5 words on delayed recall.  Some deficit in concentration noted.

## 2025-05-08 ENCOUNTER — TELEPHONE (OUTPATIENT)
Dept: NEUROLOGY | Facility: CLINIC | Age: 50
End: 2025-05-08
Payer: COMMERCIAL

## 2025-05-08 NOTE — TELEPHONE ENCOUNTER
Called Pt today to offer her an appt. Pt picks up I offer her an appt with dr gerardo on may 30th. Pt want something a little earlier. I offer another appt with Dr. Song on may 26th. Pt wanted me to call her back in the afternoon.

## 2025-08-06 ENCOUNTER — OFFICE VISIT (OUTPATIENT)
Dept: OBSTETRICS AND GYNECOLOGY | Facility: CLINIC | Age: 50
End: 2025-08-06
Payer: COMMERCIAL

## 2025-08-06 VITALS — SYSTOLIC BLOOD PRESSURE: 110 MMHG | WEIGHT: 171.5 LBS | BODY MASS INDEX: 28.54 KG/M2 | DIASTOLIC BLOOD PRESSURE: 72 MMHG

## 2025-08-06 DIAGNOSIS — Z12.31 ENCOUNTER FOR SCREENING MAMMOGRAM FOR MALIGNANT NEOPLASM OF BREAST: ICD-10-CM

## 2025-08-06 DIAGNOSIS — Z01.419 ENCOUNTER FOR ANNUAL ROUTINE GYNECOLOGICAL EXAMINATION: ICD-10-CM

## 2025-08-06 DIAGNOSIS — Z12.4 SCREENING FOR MALIGNANT NEOPLASM OF CERVIX: Primary | ICD-10-CM

## 2025-08-06 DIAGNOSIS — R10.2 PELVIC PAIN: ICD-10-CM

## 2025-08-06 PROCEDURE — 99999 PR PBB SHADOW E&M-EST. PATIENT-LVL III: CPT | Mod: PBBFAC,,, | Performed by: OBSTETRICS & GYNECOLOGY

## 2025-08-06 NOTE — PROGRESS NOTES
Chief Complaint   Patient presents with    Annual Exam     Desires pelvic ultrasound, last one was 2022,  had thickened endometrium prior to 2022    Pelvic Pain     C/o pain on the right lwr abd        History and Physical:  Patient's last menstrual period was 01/01/2021.       Lester Gutierrez is a 49 y.o. No obstetric history on file. HFwho presents today for her routine annual GYN exam. The patient has no Gynecology complaints today. Had episode of RLQ pain that reolved.  Worried about possible ovarian cyst and has hx thickened endom pre menopause and had an EmBx.  No abnl bleeding      Allergies:   Review of patient's allergies indicates:   Allergen Reactions    Salicylic acid Hives       Past Medical History:   Diagnosis Date    Colon polyp     Diverticulosis     Gallstone     GERD (gastroesophageal reflux disease)     Hiatal hernia     Hyperlipidemia     Small intestinal bacterial overgrowth (SIBO)        Past Surgical History:   Procedure Laterality Date    COLONOSCOPY  01/06/2020    Dr. South; small diverticulum in the ascending colon, otherwise normal; repeat in 10 years    COLONOSCOPY  07/26/2022    done in [place]    EGD - EXTERNAL RESULT  07/26/2022    done in [place]    UPPER GASTROINTESTINAL ENDOSCOPY  06/24/2021    Dr. Gayle; no report available; Bx: chronic gastritis, negative for metaplasia, dysplasia and for organisms    UPPER GASTROINTESTINAL ENDOSCOPY  12/03/2020    Dr. Gayle; small hiatal hernia; erythematous mucosa in the antrum; Bx: chronic gastritis, focal intestinal metaplasia, no H Pylori       MEDS: Medications Ordered Prior to Encounter[1]    OB History    No obstetric history on file.         Social History[2]    Family History   Problem Relation Name Age of Onset    Hyperlipidemia Mother      Hypertension Mother      Hyperlipidemia Father      Alzheimer's disease Father      Stroke Maternal Grandfather      Stroke Paternal Grandmother      Heart disease Paternal Grandfather       Colon cancer Maternal Uncle      Crohn's disease Other great maternal uncle     Ulcerative colitis Neg Hx           Past medical and surgical history reviewed.   I have reviewed the patient's medical history in detail and updated the computerized patient record.        Review of System:   General: no chills, fever, night sweats, weight gain or weight loss  Psychological: no depression or suicidal ideation  Breasts: no new or changing breast lumps, nipple discharge or masses.  Respiratory: no cough, shortness of breath, or wheezing  Cardiovascular: no chest pain or dyspnea on exertion  Gastrointestinal: no abdominal pain, change in bowel habits, or black or bloody stools  Genito-Urinary: no incontinence, urinary frequency/urgency or vulvar/vaginal symptoms, pelvic pain or abnormal vaginal bleeding.  Musculoskeletal: no gait disturbance or muscular weakness      Physical Exam:   /72 (BP Location: Right arm, Patient Position: Sitting)   Wt 77.8 kg (171 lb 8.3 oz)   LMP 01/01/2021   BMI 28.54 kg/m²   Constitutional: She appears alert and responsive. She appears well-developed, well-groomed, and well-nourished. No distress.   HENT:   Head: Normocephalic and atraumatic.   Eyes: Conjunctivae and EOM are normal. No scleral icterus.   Neck: Symmetrical. Normal range of motion. Neck supple. No tracheal deviation present. THYROID:  without masses or tenderness.  Cardiovascular: Normal rate, no rhythm abnormality noted. Extremities without swelling or edema, warm.    Pulmonary/Chest: Normal respiratory Effort. No distress or retractions. She exhibits no tenderness.  Breasts: are symmetrical.no masses    Right breast exhibits no inverted nipple, no mass, no nipple discharge, no skin change and no tenderness.   Left breast exhibits no inverted nipple, no mass, no nipple discharge, no skin change and no tenderness.  Abdominal: Soft. She exhibits no distension, hernias or masses. There is no tenderness. No enlargement  of liver edge or spleen.  There is no rebound and no guarding.   Genitourinary:    External rectal exam shows no thrombosed external hemorrhoids, no lesions.     Pelvic exam was performed with patient supine.   No labial fusion, and symmetrical.    There is no rash, lesion or injury on the right labia.   There is no rash, lesion or injury on the left labia.   No bleeding and no signs of injury around the vaginal introitus, urethral meatus is normal size and without prolapse or lesions, urethra well supported. The cervix is visualized with no discharge, lesions or friability.   No vaginal discharge found.    No significant Cystocele, Enterocele or rectocele, and cervix and uterus well supported.   Bimanual exam:   The urethra is normal to palpation and there are no palpable vaginal wall masses.   Uterus is not deviated, not enlarged, not fixed, normal shape and not tender.   Cervix exhibits no motion tenderness.    Right adnexum displays no mass or nodularity and no tenderness.   Left adnexum displays no mass or nodularity and no tenderness.  Musculoskeletal: Normal range of motion.   Lymphadenopathy: No inguinal adenopathy present.   Neurological: She is alert and oriented to person, place, and time. Coordination normal.   Skin: Skin is warm and dry. She is not diaphoretic. No rashes, lesions or ulcers.   Psychiatric: She has a normal mood and affect, oriented to person, place, and time.      Assessment:   Normal annual GYN exam  1. Screening for malignant neoplasm of cervix  Liquid-Based Pap Smear, Screening      2. Encounter for screening mammogram for malignant neoplasm of breast  Mammo Digital Screening Bilat w/ Tomasz (XPD)    Mammo Digital Screening Bilat w/ Tomasz (XPD)      3. Encounter for annual routine gynecological examination        4. Pelvic pain  US Pelvis Comp with Transvag NON-OB (xpd        Hx thickened endom before menopause    Plan:   PAP  Mammogram  Sono and ov to check endom and r/o ovarian  cyst  Follow up in 1 week.  Patient informed will be contacted with results within 2 weeks. Encouraged to please call back or email if she has not heard from us by then.         [1]   Current Outpatient Medications on File Prior to Visit   Medication Sig Dispense Refill    esomeprazole (NEXIUM) 40 MG capsule Take 1 capsule (40 mg total) by mouth daily as needed (gerd). 90 capsule 3    famotidine (PEPCID) 40 MG tablet TAKE 1 TABLET(40 MG) BY MOUTH EVERY DAY 30 tablet 2    tretinoin microspheres (RETIN-A MICRO PUMP) 0.06 % GlwP Apply to face at bedtime. Wash off in morning and apply sunscreen. 50 g 0     No current facility-administered medications on file prior to visit.   [2]   Social History  Socioeconomic History    Marital status: Single   Tobacco Use    Smoking status: Never     Passive exposure: Never    Smokeless tobacco: Never   Substance and Sexual Activity    Alcohol use: Not Currently    Drug use: Never     Social Drivers of Health     Financial Resource Strain: Low Risk  (6/24/2024)    Overall Financial Resource Strain (CARDIA)     Difficulty of Paying Living Expenses: Not very hard   Food Insecurity: No Food Insecurity (5/5/2023)    Hunger Vital Sign     Worried About Running Out of Food in the Last Year: Never true     Ran Out of Food in the Last Year: Never true   Transportation Needs: No Transportation Needs (5/5/2023)    PRAPARE - Transportation     Lack of Transportation (Medical): No     Lack of Transportation (Non-Medical): No   Physical Activity: Unknown (6/24/2024)    Exercise Vital Sign     Days of Exercise per Week: 2 days   Stress: Stress Concern Present (6/24/2024)    [employer] [place] [employer] - Occupational Stress Questionnaire     Feeling of Stress : To some extent   Housing Stability: Low Risk  (5/5/2023)    Housing Stability Vital Sign     Unable to Pay for Housing in the Last Year: No     Number of Places Lived in the Last Year: 1     Unstable Housing in the Last Year: No

## 2025-08-13 ENCOUNTER — HOSPITAL ENCOUNTER (OUTPATIENT)
Dept: RADIOLOGY | Facility: HOSPITAL | Age: 50
Discharge: HOME | End: 2025-08-13
Attending: OBSTETRICS & GYNECOLOGY
Payer: COMMERCIAL

## 2025-08-13 DIAGNOSIS — R10.2 PELVIC PAIN: ICD-10-CM

## 2025-08-13 PROCEDURE — 76856 US EXAM PELVIC COMPLETE: CPT | Mod: 26,,, | Performed by: RADIOLOGY

## 2025-08-13 PROCEDURE — 76830 TRANSVAGINAL US NON-OB: CPT | Mod: 26,,, | Performed by: RADIOLOGY

## 2025-08-13 PROCEDURE — 76830 TRANSVAGINAL US NON-OB: CPT | Mod: TC,PN

## 2025-08-22 ENCOUNTER — PATIENT MESSAGE (OUTPATIENT)
Dept: INTERNAL MEDICINE | Facility: CLINIC | Age: 50
End: 2025-08-22
Payer: COMMERCIAL

## 2025-08-22 DIAGNOSIS — L81.1 MELASMA: ICD-10-CM

## 2025-09-04 ENCOUNTER — OFFICE VISIT (OUTPATIENT)
Dept: OBSTETRICS AND GYNECOLOGY | Facility: CLINIC | Age: 50
End: 2025-09-04
Payer: COMMERCIAL

## 2025-09-04 VITALS
SYSTOLIC BLOOD PRESSURE: 110 MMHG | WEIGHT: 170.44 LBS | BODY MASS INDEX: 28.36 KG/M2 | DIASTOLIC BLOOD PRESSURE: 70 MMHG

## 2025-09-04 DIAGNOSIS — R93.89 THICKENED ENDOMETRIUM: ICD-10-CM

## 2025-09-04 DIAGNOSIS — R10.2 PELVIC PAIN: Primary | ICD-10-CM

## 2025-09-04 PROCEDURE — 99213 OFFICE O/P EST LOW 20 MIN: CPT | Mod: 25,S$GLB,, | Performed by: OBSTETRICS & GYNECOLOGY

## 2025-09-04 PROCEDURE — 1159F MED LIST DOCD IN RCRD: CPT | Mod: CPTII,S$GLB,, | Performed by: OBSTETRICS & GYNECOLOGY

## 2025-09-04 PROCEDURE — 3074F SYST BP LT 130 MM HG: CPT | Mod: CPTII,S$GLB,, | Performed by: OBSTETRICS & GYNECOLOGY

## 2025-09-04 PROCEDURE — 58100 BIOPSY OF UTERUS LINING: CPT | Mod: S$GLB,,, | Performed by: OBSTETRICS & GYNECOLOGY

## 2025-09-04 PROCEDURE — 1160F RVW MEDS BY RX/DR IN RCRD: CPT | Mod: CPTII,S$GLB,, | Performed by: OBSTETRICS & GYNECOLOGY

## 2025-09-04 PROCEDURE — 3044F HG A1C LEVEL LT 7.0%: CPT | Mod: CPTII,S$GLB,, | Performed by: OBSTETRICS & GYNECOLOGY

## 2025-09-04 PROCEDURE — 3078F DIAST BP <80 MM HG: CPT | Mod: CPTII,S$GLB,, | Performed by: OBSTETRICS & GYNECOLOGY

## 2025-09-04 PROCEDURE — 99999 PR PBB SHADOW E&M-EST. PATIENT-LVL III: CPT | Mod: PBBFAC,,, | Performed by: OBSTETRICS & GYNECOLOGY

## 2025-09-04 PROCEDURE — 3008F BODY MASS INDEX DOCD: CPT | Mod: CPTII,S$GLB,, | Performed by: OBSTETRICS & GYNECOLOGY
